# Patient Record
Sex: FEMALE | Race: OTHER | HISPANIC OR LATINO | Employment: PART TIME | URBAN - METROPOLITAN AREA
[De-identification: names, ages, dates, MRNs, and addresses within clinical notes are randomized per-mention and may not be internally consistent; named-entity substitution may affect disease eponyms.]

---

## 2023-04-06 ENCOUNTER — OFFICE VISIT (OUTPATIENT)
Dept: URGENT CARE | Age: 18
End: 2023-04-06

## 2023-04-06 VITALS
HEART RATE: 63 BPM | DIASTOLIC BLOOD PRESSURE: 72 MMHG | TEMPERATURE: 98.2 F | RESPIRATION RATE: 18 BRPM | OXYGEN SATURATION: 98 % | SYSTOLIC BLOOD PRESSURE: 114 MMHG

## 2023-04-06 DIAGNOSIS — J20.9 ACUTE BRONCHITIS, UNSPECIFIED ORGANISM: Primary | ICD-10-CM

## 2023-04-06 RX ORDER — AZITHROMYCIN 250 MG/1
TABLET, FILM COATED ORAL
Qty: 6 TABLET | Refills: 0 | Status: SHIPPED | OUTPATIENT
Start: 2023-04-06 | End: 2023-04-10

## 2023-04-06 RX ORDER — PREDNISONE 20 MG/1
40 TABLET ORAL DAILY
Qty: 10 TABLET | Refills: 0 | Status: SHIPPED | OUTPATIENT
Start: 2023-04-06 | End: 2023-04-11

## 2023-04-06 RX ORDER — ALBUTEROL SULFATE 90 UG/1
2 AEROSOL, METERED RESPIRATORY (INHALATION) EVERY 6 HOURS PRN
Qty: 6.7 G | Refills: 0 | Status: SHIPPED | OUTPATIENT
Start: 2023-04-06

## 2023-04-06 RX ORDER — BENZONATATE 100 MG/1
100 CAPSULE ORAL 3 TIMES DAILY PRN
Qty: 20 CAPSULE | Refills: 0 | Status: SHIPPED | OUTPATIENT
Start: 2023-04-06

## 2023-04-06 NOTE — PATIENT INSTRUCTIONS
Take antibiotic as prescribed  Recommend probiotic use while taking antibiotic  Use albuterol as directed, as needed for shortness of breath and wheezing  Use benzonatate as directed as needed for cough  Take steroids as directed  Recommend to take them in the morning and with food  Acetaminophen for pain  Recommend throat lozenges, anesthetic spray such as chloraseptic, and gargling warm salt water for throat irritation  Follow-up with PCP in 3-5 days  Go to ER if symptoms worsen

## 2023-04-06 NOTE — PROGRESS NOTES
[facility]        NAME: Rosemary Gorman is a 21 y o  female  : 2005    MRN: [id number]  DATE: 2023  TIME: 1:10 PM      Assessment and Plan     Acute bronchitis, unspecified organism [J20 9]  1  Acute bronchitis, unspecified organism  azithromycin (ZITHROMAX) 250 mg tablet    albuterol (Proventil HFA) 90 mcg/act inhaler    benzonatate (TESSALON PERLES) 100 mg capsule    predniSONE 20 mg tablet            Patient Instructions     Take antibiotic as prescribed  Recommend probiotic use while taking antibiotic  Use albuterol as directed, as needed for shortness of breath and wheezing  Use benzonatate as directed as needed for cough  Take steroids as directed  Recommend to take them in the morning and with food  Acetaminophen for pain  Recommend throat lozenges, anesthetic spray such as chloraseptic, and gargling warm salt water for throat irritation  Follow-up with PCP in 3-5 days  Go to ER if symptoms worsen  Chief Complaint     Chief Complaint   Patient presents with   • Cough     Cough for past two weeks with green/yellow mucus         History of Present Illness     Patient is a 29-year-old female who presents with worsening productive cough for the past 2 weeks  Reports green mucus production  Reports wheezing  Reports sore throat  Denies asthma history  Denies vomiting or diarrhea  Denies fever or chills  Denies chance of pregnancy  Denies smoking history  Denies diabetic history  States she has tried over-the-counter medication with no relief  Review of Systems     Review of Systems   Constitutional: Negative for chills and fever  HENT: Positive for sore throat  Negative for congestion and rhinorrhea  Respiratory: Positive for cough  Gastrointestinal: Negative for diarrhea, nausea and vomiting  All other systems reviewed and are negative          Current Medications       Current Outpatient Medications:   •  albuterol (Proventil HFA) 90 mcg/act inhaler, Inhale 2 puffs every 6 (six) hours as needed for wheezing, Disp: 6 7 g, Rfl: 0  •  azithromycin (ZITHROMAX) 250 mg tablet, Take 2 tablets today then 1 tablet daily x 4 days, Disp: 6 tablet, Rfl: 0  •  benzonatate (TESSALON PERLES) 100 mg capsule, Take 1 capsule (100 mg total) by mouth 3 (three) times a day as needed for cough, Disp: 20 capsule, Rfl: 0  •  predniSONE 20 mg tablet, Take 2 tablets (40 mg total) by mouth daily for 5 days, Disp: 10 tablet, Rfl: 0    Current Allergies     Allergies as of 04/06/2023   • (No Known Allergies)              The following portions of the patient's history were reviewed and updated as appropriate: allergies, current medications, past family history, past medical history, past social history, past surgical history and problem list      History reviewed  No pertinent past medical history  History reviewed  No pertinent surgical history  History reviewed  No pertinent family history  Medications have been verified  Objective     /72   Pulse 63   Temp 98 2 °F (36 8 °C)   Resp 18   SpO2 98%   No LMP recorded  Physical Exam     Physical Exam  Vitals and nursing note reviewed  Constitutional:       General: She is awake  She is not in acute distress  Appearance: Normal appearance  She is not ill-appearing, toxic-appearing or diaphoretic  HENT:      Right Ear: Tympanic membrane, ear canal and external ear normal       Left Ear: Tympanic membrane, ear canal and external ear normal       Nose: Nose normal       Mouth/Throat:      Lips: Pink  Mouth: Mucous membranes are moist       Pharynx: Oropharynx is clear  Uvula midline  No oropharyngeal exudate or posterior oropharyngeal erythema  Cardiovascular:      Rate and Rhythm: Normal rate  Pulses: Normal pulses  Heart sounds: Normal heart sounds, S1 normal and S2 normal    Pulmonary:      Effort: Pulmonary effort is normal       Breath sounds: Normal air entry   No decreased air movement or transmitted upper airway sounds  Examination of the right-upper field reveals wheezing  Examination of the left-upper field reveals wheezing  Wheezing (Scant inspiratory) present  No decreased breath sounds  Skin:     General: Skin is warm  Capillary Refill: Capillary refill takes less than 2 seconds  Neurological:      Mental Status: She is alert  Psychiatric:         Mood and Affect: Mood normal          Behavior: Behavior normal          Thought Content:  Thought content normal          Judgment: Judgment normal